# Patient Record
Sex: FEMALE | Race: WHITE | NOT HISPANIC OR LATINO | URBAN - NONMETROPOLITAN AREA
[De-identification: names, ages, dates, MRNs, and addresses within clinical notes are randomized per-mention and may not be internally consistent; named-entity substitution may affect disease eponyms.]

---

## 2024-01-08 ENCOUNTER — OFFICE VISIT (OUTPATIENT)
Dept: SURGERY | Facility: CLINIC | Age: 54
End: 2024-01-08
Payer: COMMERCIAL

## 2024-01-08 VITALS
SYSTOLIC BLOOD PRESSURE: 101 MMHG | HEIGHT: 67 IN | HEART RATE: 80 BPM | DIASTOLIC BLOOD PRESSURE: 70 MMHG | WEIGHT: 153 LBS | BODY MASS INDEX: 24.01 KG/M2

## 2024-01-08 DIAGNOSIS — E65 ABDOMINAL PANNUS: Primary | ICD-10-CM

## 2024-01-08 PROCEDURE — 99203 OFFICE O/P NEW LOW 30 MIN: CPT | Performed by: SURGERY

## 2024-01-08 RX ORDER — PANTOPRAZOLE SODIUM 40 MG/1
TABLET, DELAYED RELEASE ORAL
COMMUNITY
Start: 2024-01-06

## 2024-01-08 RX ORDER — TIRZEPATIDE 12.5 MG/.5ML
INJECTION, SOLUTION SUBCUTANEOUS
COMMUNITY
Start: 2023-12-21

## 2024-01-08 RX ORDER — CLOPIDOGREL BISULFATE 75 MG/1
TABLET ORAL
COMMUNITY
Start: 2024-01-06

## 2024-01-08 RX ORDER — CARVEDILOL 3.12 MG/1
3.12 TABLET ORAL
COMMUNITY
Start: 2024-01-06

## 2024-01-08 RX ORDER — ATORVASTATIN CALCIUM 80 MG/1
TABLET, FILM COATED ORAL
COMMUNITY
Start: 2024-01-06

## 2024-01-08 NOTE — PROGRESS NOTES
"Subjective   Clair Cochran is a 53 y.o. female is being seen for consultation today at the request of No ref. provider found    History of Present Illness    Allergies   Allergen Reactions    Eggs Or Egg-Derived Products Anaphylaxis    Bactrim [Sulfamethoxazole-Trimethoprim] Swelling and Rash     Current Outpatient Medications   Medication Sig Dispense Refill    atorvastatin (LIPITOR) 80 MG tablet TAKE ONE TABLET BY MOUTH EVERY DAY AT BEDTIME WITH FOOD FOR CHOLESTEROL      carvedilol (COREG) 3.125 MG tablet 1 tablet.      clopidogrel (PLAVIX) 75 MG tablet TAKE ONE TABLET BY MOUTH EVERY DAY TO THIN THE BLOOD      Mounjaro 12.5 MG/0.5ML solution pen-injector pen INJECT 0.5 ML (12.5 MG TOTAL) UNDER THE SKIN 1 (ONE) TIME PER WEEK.      pantoprazole (PROTONIX) 40 MG EC tablet TAKE 1 TABLET BY MOUTH EVERY DAY FOR THE STOMACH       No current facility-administered medications for this visit.     Past Medical History:   Diagnosis Date    Anemia     Coronary artery disease     Diabetes mellitus     Diverticulitis of colon     Hypertension     Myocardial infarction      Past Surgical History:   Procedure Laterality Date    BREAST SURGERY      FRACTURE SURGERY         Pertinent Review of Systems:  Respiratory: no shortness of breath  Cardiovascular: no chest pain  Other pertinent:      Objective   /70   Pulse 80   Ht 170.2 cm (67\")   Wt 69.4 kg (153 lb)   BMI 23.96 kg/m²   Physical Exam    Procedures     Results/Data:  Imaging: { }  Notes: { }  Lab: { }  Other: { }    Assessment & Plan            Discussion/Summary    Time spent: { }    BMI is within normal parameters. No other follow-up for BMI required.       No future appointments.      Please note that portions of this note were completed with a voice recognition program.  "

## 2024-01-08 NOTE — PROGRESS NOTES
Subjective   Clair Cochran is a 53 y.o. female is here today as a self referral.    History of Present Illness  Ms. Cochran was seen in the office today to discuss removal of excess skin.  Over the last year the patient has lost 115 pounds with diet, increased exercise and Mounjaro injections.  She states she is now with her goal weight and has been for 1 month.  She reports infections 2-3 times per month under her skin for which she uses triple antibiotic ointment, Aquaphor diaper rash cream, hydrocortisone cream and various lotions.  She reports back pain which she never had prior which she attributes to her hanging skin.  This is worse with prolonged standing.  She also reports physical difficulty with bending and getting dressed due to the hanging skin as well as pressure on her bladder.  Allergies   Allergen Reactions    Eggs Or Egg-Derived Products Anaphylaxis    Bactrim [Sulfamethoxazole-Trimethoprim] Swelling and Rash     Current Outpatient Medications   Medication Sig Dispense Refill    atorvastatin (LIPITOR) 80 MG tablet TAKE ONE TABLET BY MOUTH EVERY DAY AT BEDTIME WITH FOOD FOR CHOLESTEROL      carvedilol (COREG) 3.125 MG tablet 1 tablet.      clopidogrel (PLAVIX) 75 MG tablet TAKE ONE TABLET BY MOUTH EVERY DAY TO THIN THE BLOOD      Mounjaro 12.5 MG/0.5ML solution pen-injector pen INJECT 0.5 ML (12.5 MG TOTAL) UNDER THE SKIN 1 (ONE) TIME PER WEEK.      pantoprazole (PROTONIX) 40 MG EC tablet TAKE 1 TABLET BY MOUTH EVERY DAY FOR THE STOMACH       No current facility-administered medications for this visit.     Past Medical History:   Diagnosis Date    Anemia     Coronary artery disease     Diabetes mellitus     Diverticulitis of colon     Hypertension     Myocardial infarction      Past Surgical History:   Procedure Laterality Date    BREAST SURGERY      FRACTURE SURGERY         Pertinent Review of Systems:  Respiratory: no shortness of breath  Cardiovascular: no chest pain  Other pertinent: Patient does  "take Plavix for a stent placement several years ago.      Objective   /70   Pulse 80   Ht 170.2 cm (67\")   Wt 69.4 kg (153 lb)   BMI 23.96 kg/m²   Physical Exam  On examination this is a thin female in no acute distress  HEENT examination: Within normal limits.  Conjunctiva pink.  Nose and ears appear normal.  Neck: Supple, full range of motion.  No JVD.  Abdomen: Abdomen is soft, nontender.  Umbilical hernia is not appreciated  Musculoskeletal: Full range of motion all extremities without focal weakness. Normal gait. No digital cyanosis.  Psych: Patient is alert, oriented x3. Mood and affect are appropriate.  Skin: Patient has a large pannus both at the umbilicus, pubis and suprapubic areas.  There is some skin irritation noted around the umbilicus.  Procedures     Results/Data:      Assessment & Plan   Symptomatic 3 tier abdominal pannus    Advised patient that she would need to have stable weight for 6 months prior to consideration for removal of excess skin.  She will also need cardiology clearance and will discuss this with her cardiologist.         Discussion/Summary    Time spent:     BMI is within normal parameters. No other follow-up for BMI required.       No future appointments.      Please note that portions of this note were completed with a voice recognition program.  "

## 2024-06-10 ENCOUNTER — OFFICE VISIT (OUTPATIENT)
Dept: SURGERY | Facility: CLINIC | Age: 54
End: 2024-06-10
Payer: COMMERCIAL

## 2024-06-10 VITALS
HEART RATE: 76 BPM | WEIGHT: 141.6 LBS | DIASTOLIC BLOOD PRESSURE: 56 MMHG | HEIGHT: 67 IN | BODY MASS INDEX: 22.22 KG/M2 | SYSTOLIC BLOOD PRESSURE: 106 MMHG

## 2024-06-10 DIAGNOSIS — E65 ABDOMINAL PANNUS: Primary | ICD-10-CM

## 2024-06-10 PROCEDURE — 99213 OFFICE O/P EST LOW 20 MIN: CPT | Performed by: SURGERY

## 2024-06-10 NOTE — PROGRESS NOTES
Subjective   Clair Cochran is a 53 y.o. female here today for excess skin.    History of Present Illness  Ms. Cochran was seen in the office today to discuss removal of excess skin. Over the last 18 months the patient has lost 125 pounds with diet, increased exercise and Mounjaro injections. She states she is now with her goal weight and has been for greater than 6 months. She reports infections 2-3 times per month under her skin for which she uses triple antibiotic ointment, Aquaphor diaper rash cream, hydrocortisone cream and various lotions. She reports back pain which she never had prior which she attributes to her hanging skin. This is worse with prolonged standing. She also reports physical difficulty with bending and getting dressed due to the hanging skin as well as pressure on her bladder.  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  Patient states she has seen her cardiologist who has cleared her for the surgical procedure.  Allergies   Allergen Reactions    Egg-Derived Products Anaphylaxis    Bactrim [Sulfamethoxazole-Trimethoprim] Swelling and Rash       Current Outpatient Medications   Medication Sig Dispense Refill    atorvastatin (LIPITOR) 80 MG tablet TAKE ONE TABLET BY MOUTH EVERY DAY AT BEDTIME WITH FOOD FOR CHOLESTEROL      carvedilol (COREG) 3.125 MG tablet 1 tablet.      clopidogrel (PLAVIX) 75 MG tablet TAKE ONE TABLET BY MOUTH EVERY DAY TO THIN THE BLOOD      Mounjaro 12.5 MG/0.5ML solution pen-injector pen INJECT 0.5 ML (12.5 MG TOTAL) UNDER THE SKIN 1 (ONE) TIME PER WEEK.      pantoprazole (PROTONIX) 40 MG EC tablet TAKE 1 TABLET BY MOUTH EVERY DAY FOR THE STOMACH       No current facility-administered medications for this visit.     Past Medical History:   Diagnosis Date    Anemia     Coronary artery disease     Diabetes mellitus     Diverticulitis of colon     Hypertension     Myocardial  "infarction      Past Surgical History:   Procedure Laterality Date    BREAST SURGERY      FRACTURE SURGERY         Pertinent Review of Systems  Negative for chest pain or shortness of breath    Objective   /56 (BP Location: Left arm)   Pulse 76   Ht 170.2 cm (67\")   Wt 64.2 kg (141 lb 9.6 oz)   BMI 22.18 kg/m²    Physical Exam  HEENT examination: Within normal limits.  Conjunctiva pink.  Nose and ears appear normal.  Neck: Supple, full range of motion.  No JVD.  Abdomen: Abdomen is soft, nontender.  Umbilical hernia is not appreciated  Musculoskeletal: Full range of motion all extremities without focal weakness. Normal gait. No digital cyanosis.  Psych: Patient is alert, oriented x3. Mood and affect are appropriate.  Skin: Patient has a large pannus both at the umbilicus, pubis and suprapubic areas.  There is some skin irritation noted around the umbilicus.  Photos were taken and uploaded to epic to document the excess skin   Procedures     Results/Data:      Assessment & Plan   Symptomatic abdominal pannus    Will submit to insurance for authorization       Discussion/Summary:    Time spent:     BMI is within normal parameters. No other follow-up for BMI required.         Future Appointments   Date Time Provider Department Center   6/10/2024  9:10 AM Gi Roth MD [facility]           This document has been electronically signed by   Mary Ann 10, 2024 09:08 EDT    Please note that portions of this note were completed with a voice recognition program.  "

## 2024-12-03 ENCOUNTER — PREP FOR SURGERY (OUTPATIENT)
Dept: OTHER | Facility: HOSPITAL | Age: 54
End: 2024-12-03
Payer: COMMERCIAL

## 2024-12-03 DIAGNOSIS — E65 ABDOMINAL PANNUS: Primary | ICD-10-CM

## 2025-01-03 ENCOUNTER — PRE-ADMISSION TESTING (OUTPATIENT)
Dept: PREADMISSION TESTING | Facility: HOSPITAL | Age: 55
End: 2025-01-03
Payer: COMMERCIAL

## 2025-01-03 ENCOUNTER — OFFICE VISIT (OUTPATIENT)
Dept: SURGERY | Facility: CLINIC | Age: 55
End: 2025-01-03
Payer: COMMERCIAL

## 2025-01-03 VITALS
HEART RATE: 72 BPM | DIASTOLIC BLOOD PRESSURE: 60 MMHG | BODY MASS INDEX: 21.63 KG/M2 | WEIGHT: 137.8 LBS | HEIGHT: 67 IN | SYSTOLIC BLOOD PRESSURE: 102 MMHG

## 2025-01-03 DIAGNOSIS — E65 ABDOMINAL PANNUS: Primary | ICD-10-CM

## 2025-01-03 DIAGNOSIS — E65 ABDOMINAL PANNUS: ICD-10-CM

## 2025-01-03 LAB
ANION GAP SERPL CALCULATED.3IONS-SCNC: 11.1 MMOL/L (ref 5–15)
BUN SERPL-MCNC: 11 MG/DL (ref 6–20)
BUN/CREAT SERPL: 30.6 (ref 7–25)
CALCIUM SPEC-SCNC: 9.2 MG/DL (ref 8.6–10.5)
CHLORIDE SERPL-SCNC: 108 MMOL/L (ref 98–107)
CO2 SERPL-SCNC: 27.9 MMOL/L (ref 22–29)
CREAT SERPL-MCNC: 0.36 MG/DL (ref 0.57–1)
DEPRECATED RDW RBC AUTO: 45.2 FL (ref 37–54)
EGFRCR SERPLBLD CKD-EPI 2021: 119.3 ML/MIN/1.73
ERYTHROCYTE [DISTWIDTH] IN BLOOD BY AUTOMATED COUNT: 12.9 % (ref 12.3–15.4)
GLUCOSE SERPL-MCNC: 66 MG/DL (ref 65–99)
HCT VFR BLD AUTO: 35.2 % (ref 34–46.6)
HGB BLD-MCNC: 11 G/DL (ref 12–15.9)
MCH RBC QN AUTO: 30 PG (ref 26.6–33)
MCHC RBC AUTO-ENTMCNC: 31.3 G/DL (ref 31.5–35.7)
MCV RBC AUTO: 95.9 FL (ref 79–97)
PLATELET # BLD AUTO: 180 10*3/MM3 (ref 140–450)
PMV BLD AUTO: 9 FL (ref 6–12)
POTASSIUM SERPL-SCNC: 4.3 MMOL/L (ref 3.5–5.2)
RBC # BLD AUTO: 3.67 10*6/MM3 (ref 3.77–5.28)
SODIUM SERPL-SCNC: 147 MMOL/L (ref 136–145)
WBC NRBC COR # BLD AUTO: 4.45 10*3/MM3 (ref 3.4–10.8)

## 2025-01-03 PROCEDURE — 80048 BASIC METABOLIC PNL TOTAL CA: CPT

## 2025-01-03 PROCEDURE — 85027 COMPLETE CBC AUTOMATED: CPT

## 2025-01-03 PROCEDURE — 99213 OFFICE O/P EST LOW 20 MIN: CPT | Performed by: SURGERY

## 2025-01-03 PROCEDURE — 36415 COLL VENOUS BLD VENIPUNCTURE: CPT

## 2025-01-03 RX ORDER — ASCORBIC ACID 500 MG
500 TABLET ORAL DAILY
COMMUNITY

## 2025-01-03 RX ORDER — EZETIMIBE 10 MG/1
10 TABLET ORAL DAILY
COMMUNITY

## 2025-01-03 RX ORDER — FERROUS SULFATE 325(65) MG
325 TABLET ORAL 2 TIMES DAILY WITH MEALS
COMMUNITY

## 2025-01-03 RX ORDER — CALCIUM CARBONATE/VITAMIN D3 500 MG-10
1 TABLET ORAL DAILY
COMMUNITY

## 2025-01-03 RX ORDER — EAR PLUGS
15 EACH OTIC (EAR) DAILY
COMMUNITY

## 2025-01-03 NOTE — PROGRESS NOTES
Subjective   Clair Cochran is a 54 y.o. female here today for H and P.    History of Present Illness  Ms. Cochran was seen in the office today for her preop visit prior to panniculectomy. Over the last 18 months the patient has lost 125 pounds with diet, increased exercise and Mounjaro injections. She states she is now with her goal weight and has been for greater than 6 months. She reports infections 2-3 times per month under her skin for which she uses triple antibiotic ointment, Aquaphor diaper rash cream, hydrocortisone cream and various lotions. She reports back pain which she never had prior which she attributes to her hanging skin. This is worse with prolonged standing. She also reports physical difficulty with bending and getting dressed due to the hanging skin as well as pressure on her bladder.    She was seen by cardiology and cleared for surgery.  She took her last dose of Plavix yesterday.  Allergies   Allergen Reactions    Egg-Derived Products Anaphylaxis    Bactrim [Sulfamethoxazole-Trimethoprim] Swelling and Rash       Current Outpatient Medications   Medication Sig Dispense Refill    ascorbic acid (VITAMIN C) 500 MG tablet Take 1 tablet by mouth Daily.      atorvastatin (LIPITOR) 80 MG tablet Take 1 tablet by mouth Every Night.      Calcium Carb-Cholecalciferol (Oyster Shell Calcium/D) 500-10 MG-MCG tablet tablet Take 1 tablet by mouth Daily.      carvedilol (COREG) 3.125 MG tablet Take 1 tablet by mouth Daily.      Cyanocobalamin (Vitamin B-12) 1000 MCG/15ML liquid Take 15 mL by mouth Daily.      ezetimibe (ZETIA) 10 MG tablet Take 1 tablet by mouth Daily.      ferrous sulfate 325 (65 FE) MG tablet Take 1 tablet by mouth 2 (Two) Times a Day With Meals.      pantoprazole (PROTONIX) 40 MG EC tablet Take 1 tablet by mouth Daily.      clopidogrel (PLAVIX) 75 MG tablet Take 1 tablet by mouth Daily. (Patient not taking: Reported on 1/3/2025)      Mounjaro 12.5 MG/0.5ML solution pen-injector pen Inject  "12.5 mg under the skin into the appropriate area as directed Every 14 (Fourteen) Days. (Patient not taking: Reported on 1/3/2025)       No current facility-administered medications for this visit.     Past Medical History:   Diagnosis Date    Anemia     Coronary artery disease     Diabetes mellitus     Diverticulitis of colon     GERD (gastroesophageal reflux disease)     Hypertension     Myocardial infarction     Sleep apnea      Past Surgical History:   Procedure Laterality Date    BREAST SURGERY      CARDIAC CATHETERIZATION      COLONOSCOPY      CORONARY ANGIOPLASTY WITH STENT PLACEMENT      ENDOSCOPY      FRACTURE SURGERY      SKIN BIOPSY         Pertinent Review of Systems  Negative for chest pain or shortness of breath    Objective   /60 (BP Location: Right arm)   Ht 170.2 cm (67\")   Wt 62.5 kg (137 lb 12.8 oz)   BMI 21.58 kg/m²    Physical Exam  General:  This is a WD WN female in no acute distress  Lungs:  Respiratory effort normal. Auscultation: Clear, without wheezes, rhonchi, rales  Heart:  Regular rate and rhythm, without murmur, gallop, rub.  No pedal edema  Abdomen: No abdominal wall hernias appreciated.  Skin: Patient has multiple tiers of hanging skin.  Procedures     Results/Data:      Assessment & Plan   Symptomatic abdominal pannus    Proceed with panniculectomy       Discussion/Summary: The risks of the surgical procedure were discussed.  Options of alternative treatments including no treatment (if applicable) were discussed.  Patient voiced understanding of the above issues and wishes to proceed    Time spent:     BMI is within normal parameters. No other follow-up for BMI required.       @[email or web address]    This document has been electronically signed by        January 3, 2025 08:47 EST    Please note that portions of this note were completed with a voice recognition program.  "

## 2025-01-03 NOTE — H&P (VIEW-ONLY)
Subjective  Clair Cochran is a 54 y.o. female here today for H and P.    History of Present Illness  Ms. Cochran was seen in the office today for her preop visit prior to panniculectomy. Over the last 18 months the patient has lost 125 pounds with diet, increased exercise and Mounjaro injections. She states she is now with her goal weight and has been for greater than 6 months. She reports infections 2-3 times per month under her skin for which she uses triple antibiotic ointment, Aquaphor diaper rash cream, hydrocortisone cream and various lotions. She reports back pain which she never had prior which she attributes to her hanging skin. This is worse with prolonged standing. She also reports physical difficulty with bending and getting dressed due to the hanging skin as well as pressure on her bladder.    She was seen by cardiology and cleared for surgery.  She took her last dose of Plavix yesterday.  Allergies   Allergen Reactions    Egg-Derived Products Anaphylaxis    Bactrim [Sulfamethoxazole-Trimethoprim] Swelling and Rash       Current Outpatient Medications   Medication Sig Dispense Refill    ascorbic acid (VITAMIN C) 500 MG tablet Take 1 tablet by mouth Daily.      atorvastatin (LIPITOR) 80 MG tablet Take 1 tablet by mouth Every Night.      Calcium Carb-Cholecalciferol (Oyster Shell Calcium/D) 500-10 MG-MCG tablet tablet Take 1 tablet by mouth Daily.      carvedilol (COREG) 3.125 MG tablet Take 1 tablet by mouth Daily.      Cyanocobalamin (Vitamin B-12) 1000 MCG/15ML liquid Take 15 mL by mouth Daily.      ezetimibe (ZETIA) 10 MG tablet Take 1 tablet by mouth Daily.      ferrous sulfate 325 (65 FE) MG tablet Take 1 tablet by mouth 2 (Two) Times a Day With Meals.      pantoprazole (PROTONIX) 40 MG EC tablet Take 1 tablet by mouth Daily.      clopidogrel (PLAVIX) 75 MG tablet Take 1 tablet by mouth Daily. (Patient not taking: Reported on 1/3/2025)      Mounjaro 12.5 MG/0.5ML solution pen-injector pen Inject 12.5  "mg under the skin into the appropriate area as directed Every 14 (Fourteen) Days. (Patient not taking: Reported on 1/3/2025)       No current facility-administered medications for this visit.     Past Medical History:   Diagnosis Date    Anemia     Coronary artery disease     Diabetes mellitus     Diverticulitis of colon     GERD (gastroesophageal reflux disease)     Hypertension     Myocardial infarction     Sleep apnea      Past Surgical History:   Procedure Laterality Date    BREAST SURGERY      CARDIAC CATHETERIZATION      COLONOSCOPY      CORONARY ANGIOPLASTY WITH STENT PLACEMENT      ENDOSCOPY      FRACTURE SURGERY      SKIN BIOPSY         Pertinent Review of Systems  Negative for chest pain or shortness of breath    Objective  /60 (BP Location: Right arm)   Ht 170.2 cm (67\")   Wt 62.5 kg (137 lb 12.8 oz)   BMI 21.58 kg/m²    Physical Exam  General:  This is a WD WN female in no acute distress  Lungs:  Respiratory effort normal. Auscultation: Clear, without wheezes, rhonchi, rales  Heart:  Regular rate and rhythm, without murmur, gallop, rub.  No pedal edema  Abdomen: No abdominal wall hernias appreciated.  Skin: Patient has multiple tiers of hanging skin.  Procedures     Results/Data:      Assessment & Plan  Symptomatic abdominal pannus    Proceed with panniculectomy       Discussion/Summary: The risks of the surgical procedure were discussed.  Options of alternative treatments including no treatment (if applicable) were discussed.  Patient voiced understanding of the above issues and wishes to proceed    Time spent:     BMI is within normal parameters. No other follow-up for BMI required.       @[facility]    This document has been electronically signed by        January 3, 2025 08:47 EST    Please note that portions of this note were completed with a voice recognition program.  This document has been electronically signed by Gi GAYLE MD on January 3, 2025 09:11 EST  "

## 2025-01-03 NOTE — DISCHARGE INSTRUCTIONS

## 2025-01-03 NOTE — H&P
Subjective  Clair Cochran is a 54 y.o. female here today for H and P.    History of Present Illness  Ms. Cochran was seen in the office today for her preop visit prior to panniculectomy. Over the last 18 months the patient has lost 125 pounds with diet, increased exercise and Mounjaro injections. She states she is now with her goal weight and has been for greater than 6 months. She reports infections 2-3 times per month under her skin for which she uses triple antibiotic ointment, Aquaphor diaper rash cream, hydrocortisone cream and various lotions. She reports back pain which she never had prior which she attributes to her hanging skin. This is worse with prolonged standing. She also reports physical difficulty with bending and getting dressed due to the hanging skin as well as pressure on her bladder.    She was seen by cardiology and cleared for surgery.  She took her last dose of Plavix yesterday.  Allergies   Allergen Reactions    Egg-Derived Products Anaphylaxis    Bactrim [Sulfamethoxazole-Trimethoprim] Swelling and Rash       Current Outpatient Medications   Medication Sig Dispense Refill    ascorbic acid (VITAMIN C) 500 MG tablet Take 1 tablet by mouth Daily.      atorvastatin (LIPITOR) 80 MG tablet Take 1 tablet by mouth Every Night.      Calcium Carb-Cholecalciferol (Oyster Shell Calcium/D) 500-10 MG-MCG tablet tablet Take 1 tablet by mouth Daily.      carvedilol (COREG) 3.125 MG tablet Take 1 tablet by mouth Daily.      Cyanocobalamin (Vitamin B-12) 1000 MCG/15ML liquid Take 15 mL by mouth Daily.      ezetimibe (ZETIA) 10 MG tablet Take 1 tablet by mouth Daily.      ferrous sulfate 325 (65 FE) MG tablet Take 1 tablet by mouth 2 (Two) Times a Day With Meals.      pantoprazole (PROTONIX) 40 MG EC tablet Take 1 tablet by mouth Daily.      clopidogrel (PLAVIX) 75 MG tablet Take 1 tablet by mouth Daily. (Patient not taking: Reported on 1/3/2025)      Mounjaro 12.5 MG/0.5ML solution pen-injector pen Inject 12.5  "mg under the skin into the appropriate area as directed Every 14 (Fourteen) Days. (Patient not taking: Reported on 1/3/2025)       No current facility-administered medications for this visit.     Past Medical History:   Diagnosis Date    Anemia     Coronary artery disease     Diabetes mellitus     Diverticulitis of colon     GERD (gastroesophageal reflux disease)     Hypertension     Myocardial infarction     Sleep apnea      Past Surgical History:   Procedure Laterality Date    BREAST SURGERY      CARDIAC CATHETERIZATION      COLONOSCOPY      CORONARY ANGIOPLASTY WITH STENT PLACEMENT      ENDOSCOPY      FRACTURE SURGERY      SKIN BIOPSY         Pertinent Review of Systems  Negative for chest pain or shortness of breath    Objective  /60 (BP Location: Right arm)   Ht 170.2 cm (67\")   Wt 62.5 kg (137 lb 12.8 oz)   BMI 21.58 kg/m²    Physical Exam  General:  This is a WD WN female in no acute distress  Lungs:  Respiratory effort normal. Auscultation: Clear, without wheezes, rhonchi, rales  Heart:  Regular rate and rhythm, without murmur, gallop, rub.  No pedal edema  Abdomen: No abdominal wall hernias appreciated.  Skin: Patient has multiple tiers of hanging skin.  Procedures     Results/Data:      Assessment & Plan  Symptomatic abdominal pannus    Proceed with panniculectomy       Discussion/Summary: The risks of the surgical procedure were discussed.  Options of alternative treatments including no treatment (if applicable) were discussed.  Patient voiced understanding of the above issues and wishes to proceed    Time spent:     BMI is within normal parameters. No other follow-up for BMI required.       @[facility]    This document has been electronically signed by        January 3, 2025 08:47 EST    Please note that portions of this note were completed with a voice recognition program.  This document has been electronically signed by Gi GAYLE MD on January 3, 2025 09:11 EST  "

## 2025-01-07 ENCOUNTER — TELEPHONE (OUTPATIENT)
Dept: SURGERY | Facility: CLINIC | Age: 55
End: 2025-01-07
Payer: COMMERCIAL

## 2025-01-08 ENCOUNTER — ANESTHESIA EVENT (OUTPATIENT)
Dept: PERIOP | Facility: HOSPITAL | Age: 55
End: 2025-01-08
Payer: COMMERCIAL

## 2025-01-08 ENCOUNTER — ANESTHESIA (OUTPATIENT)
Dept: PERIOP | Facility: HOSPITAL | Age: 55
End: 2025-01-08
Payer: COMMERCIAL

## 2025-01-08 ENCOUNTER — HOSPITAL ENCOUNTER (OUTPATIENT)
Facility: HOSPITAL | Age: 55
Discharge: HOME | End: 2025-01-09
Attending: SURGERY | Admitting: SURGERY
Payer: COMMERCIAL

## 2025-01-08 DIAGNOSIS — E65 ABDOMINAL PANNUS: ICD-10-CM

## 2025-01-08 DIAGNOSIS — E65 PANNUS, ABDOMINAL: Primary | ICD-10-CM

## 2025-01-08 LAB — GLUCOSE BLDC GLUCOMTR-MCNC: 65 MG/DL (ref 70–130)

## 2025-01-08 PROCEDURE — 25010000002 KETOROLAC TROMETHAMINE PER 15 MG: Performed by: NURSE ANESTHETIST, CERTIFIED REGISTERED

## 2025-01-08 PROCEDURE — 82948 REAGENT STRIP/BLOOD GLUCOSE: CPT

## 2025-01-08 PROCEDURE — G0378 HOSPITAL OBSERVATION PER HR: HCPCS

## 2025-01-08 PROCEDURE — 25010000002 LIDOCAINE PF 2% 2 % SOLUTION: Performed by: NURSE ANESTHETIST, CERTIFIED REGISTERED

## 2025-01-08 PROCEDURE — 25010000002 CEFAZOLIN PER 500 MG: Performed by: SURGERY

## 2025-01-08 PROCEDURE — 15830 EXC EXCESSIVE SKIN ABDOMEN: CPT | Performed by: SURGERY

## 2025-01-08 PROCEDURE — 25010000002 FENTANYL CITRATE (PF) 50 MCG/ML SOLUTION: Performed by: NURSE ANESTHETIST, CERTIFIED REGISTERED

## 2025-01-08 PROCEDURE — 25010000002 MIDAZOLAM PER 1 MG: Performed by: NURSE ANESTHETIST, CERTIFIED REGISTERED

## 2025-01-08 PROCEDURE — 93005 ELECTROCARDIOGRAM TRACING: CPT | Performed by: ANESTHESIOLOGY

## 2025-01-08 PROCEDURE — 93010 ELECTROCARDIOGRAM REPORT: CPT | Performed by: INTERNAL MEDICINE

## 2025-01-08 PROCEDURE — 25010000002 ONDANSETRON PER 1 MG: Performed by: NURSE ANESTHETIST, CERTIFIED REGISTERED

## 2025-01-08 PROCEDURE — 25010000002 MORPHINE PER 10 MG: Performed by: SURGERY

## 2025-01-08 PROCEDURE — 25010000002 DEXAMETHASONE PER 1 MG: Performed by: NURSE ANESTHETIST, CERTIFIED REGISTERED

## 2025-01-08 PROCEDURE — 25810000003 LACTATED RINGERS PER 1000 ML: Performed by: NURSE ANESTHETIST, CERTIFIED REGISTERED

## 2025-01-08 PROCEDURE — 25010000002 PROPOFOL 200 MG/20ML EMULSION: Performed by: NURSE ANESTHETIST, CERTIFIED REGISTERED

## 2025-01-08 DEVICE — LIGACLIP EXTRA LIGATING CLIP CARTRIDGES: 6 TITANIUM CLIPS/ CARTRIDGE (LARGE)
Type: IMPLANTABLE DEVICE | Site: ABDOMEN | Status: FUNCTIONAL
Brand: LIGACLIP

## 2025-01-08 DEVICE — LIGACLIP EXTRA LIGATING CLIP CARTRIDGES: 6 TITANIUM CLIPS/ CARTRIDGE (MEDIUM)
Type: IMPLANTABLE DEVICE | Site: ABDOMEN | Status: FUNCTIONAL
Brand: LIGACLIP

## 2025-01-08 RX ORDER — CLOPIDOGREL BISULFATE 75 MG/1
75 TABLET ORAL DAILY
Status: CANCELLED | OUTPATIENT
Start: 2025-01-08

## 2025-01-08 RX ORDER — CLOPIDOGREL BISULFATE 75 MG/1
75 TABLET ORAL DAILY
Status: CANCELLED | OUTPATIENT
Start: 2025-01-09

## 2025-01-08 RX ORDER — SODIUM CHLORIDE 0.9 % (FLUSH) 0.9 %
10 SYRINGE (ML) INJECTION AS NEEDED
Status: DISCONTINUED | OUTPATIENT
Start: 2025-01-08 | End: 2025-01-08 | Stop reason: HOSPADM

## 2025-01-08 RX ORDER — CLOPIDOGREL BISULFATE 75 MG/1
75 TABLET ORAL DAILY
Status: DISCONTINUED | OUTPATIENT
Start: 2025-01-08 | End: 2025-01-09 | Stop reason: HOSPADM

## 2025-01-08 RX ORDER — ASCORBIC ACID 500 MG
500 TABLET ORAL DAILY
Status: CANCELLED | OUTPATIENT
Start: 2025-01-08

## 2025-01-08 RX ORDER — FENTANYL CITRATE 50 UG/ML
INJECTION, SOLUTION INTRAMUSCULAR; INTRAVENOUS AS NEEDED
Status: DISCONTINUED | OUTPATIENT
Start: 2025-01-08 | End: 2025-01-08 | Stop reason: SURG

## 2025-01-08 RX ORDER — FENTANYL CITRATE 50 UG/ML
50 INJECTION, SOLUTION INTRAMUSCULAR; INTRAVENOUS
Status: DISCONTINUED | OUTPATIENT
Start: 2025-01-08 | End: 2025-01-08 | Stop reason: HOSPADM

## 2025-01-08 RX ORDER — SODIUM CHLORIDE 9 MG/ML
40 INJECTION, SOLUTION INTRAVENOUS AS NEEDED
Status: DISCONTINUED | OUTPATIENT
Start: 2025-01-08 | End: 2025-01-08 | Stop reason: HOSPADM

## 2025-01-08 RX ORDER — ASCORBIC ACID 500 MG
500 TABLET ORAL DAILY
Status: DISCONTINUED | OUTPATIENT
Start: 2025-01-08 | End: 2025-01-09 | Stop reason: HOSPADM

## 2025-01-08 RX ORDER — SODIUM CHLORIDE, SODIUM LACTATE, POTASSIUM CHLORIDE, CALCIUM CHLORIDE 600; 310; 30; 20 MG/100ML; MG/100ML; MG/100ML; MG/100ML
125 INJECTION, SOLUTION INTRAVENOUS ONCE
Status: DISCONTINUED | OUTPATIENT
Start: 2025-01-08 | End: 2025-01-08 | Stop reason: HOSPADM

## 2025-01-08 RX ORDER — GINGER ROOT/GINGER ROOT EXT 262.5 MG
1 CAPSULE ORAL DAILY
Status: CANCELLED | OUTPATIENT
Start: 2025-01-08

## 2025-01-08 RX ORDER — ACETAMINOPHEN 500 MG
1000 TABLET ORAL EVERY 6 HOURS
Status: DISCONTINUED | OUTPATIENT
Start: 2025-01-08 | End: 2025-01-09 | Stop reason: HOSPADM

## 2025-01-08 RX ORDER — SODIUM CHLORIDE 0.9 % (FLUSH) 0.9 %
10 SYRINGE (ML) INJECTION EVERY 12 HOURS SCHEDULED
Status: DISCONTINUED | OUTPATIENT
Start: 2025-01-08 | End: 2025-01-08 | Stop reason: HOSPADM

## 2025-01-08 RX ORDER — MEPERIDINE HYDROCHLORIDE 25 MG/ML
12.5 INJECTION INTRAMUSCULAR; INTRAVENOUS; SUBCUTANEOUS
Status: DISCONTINUED | OUTPATIENT
Start: 2025-01-08 | End: 2025-01-08 | Stop reason: HOSPADM

## 2025-01-08 RX ORDER — MIDAZOLAM HYDROCHLORIDE 1 MG/ML
1 INJECTION, SOLUTION INTRAMUSCULAR; INTRAVENOUS
Status: DISCONTINUED | OUTPATIENT
Start: 2025-01-08 | End: 2025-01-08 | Stop reason: HOSPADM

## 2025-01-08 RX ORDER — GINGER ROOT/GINGER ROOT EXT 262.5 MG
1 CAPSULE ORAL
Status: DISCONTINUED | OUTPATIENT
Start: 2025-01-09 | End: 2025-01-09 | Stop reason: HOSPADM

## 2025-01-08 RX ORDER — PROCHLORPERAZINE EDISYLATE 5 MG/ML
10 INJECTION INTRAMUSCULAR; INTRAVENOUS EVERY 6 HOURS PRN
Status: DISCONTINUED | OUTPATIENT
Start: 2025-01-08 | End: 2025-01-09 | Stop reason: HOSPADM

## 2025-01-08 RX ORDER — KETOROLAC TROMETHAMINE 30 MG/ML
30 INJECTION, SOLUTION INTRAMUSCULAR; INTRAVENOUS EVERY 6 HOURS PRN
Status: COMPLETED | OUTPATIENT
Start: 2025-01-08 | End: 2025-01-08

## 2025-01-08 RX ORDER — OXYCODONE HYDROCHLORIDE 10 MG/1
10 TABLET ORAL EVERY 4 HOURS PRN
Status: DISCONTINUED | OUTPATIENT
Start: 2025-01-08 | End: 2025-01-09 | Stop reason: HOSPADM

## 2025-01-08 RX ORDER — KETOROLAC TROMETHAMINE 30 MG/ML
15 INJECTION, SOLUTION INTRAMUSCULAR; INTRAVENOUS EVERY 6 HOURS
Status: DISCONTINUED | OUTPATIENT
Start: 2025-01-08 | End: 2025-01-09 | Stop reason: HOSPADM

## 2025-01-08 RX ORDER — LIDOCAINE HYDROCHLORIDE 20 MG/ML
INJECTION, SOLUTION EPIDURAL; INFILTRATION; INTRACAUDAL; PERINEURAL AS NEEDED
Status: DISCONTINUED | OUTPATIENT
Start: 2025-01-08 | End: 2025-01-08 | Stop reason: SURG

## 2025-01-08 RX ORDER — LANOLIN ALCOHOL/MO/W.PET/CERES
1000 CREAM (GRAM) TOPICAL DAILY
Status: DISCONTINUED | OUTPATIENT
Start: 2025-01-08 | End: 2025-01-09 | Stop reason: HOSPADM

## 2025-01-08 RX ORDER — SODIUM CHLORIDE 0.9 % (FLUSH) 0.9 %
10 SYRINGE (ML) INJECTION AS NEEDED
Status: DISCONTINUED | OUTPATIENT
Start: 2025-01-08 | End: 2025-01-09 | Stop reason: HOSPADM

## 2025-01-08 RX ORDER — IPRATROPIUM BROMIDE AND ALBUTEROL SULFATE 2.5; .5 MG/3ML; MG/3ML
3 SOLUTION RESPIRATORY (INHALATION) ONCE AS NEEDED
Status: DISCONTINUED | OUTPATIENT
Start: 2025-01-08 | End: 2025-01-08 | Stop reason: HOSPADM

## 2025-01-08 RX ORDER — OXYCODONE AND ACETAMINOPHEN 5; 325 MG/1; MG/1
1 TABLET ORAL ONCE AS NEEDED
Status: DISCONTINUED | OUTPATIENT
Start: 2025-01-08 | End: 2025-01-08 | Stop reason: HOSPADM

## 2025-01-08 RX ORDER — SODIUM CHLORIDE 9 MG/ML
40 INJECTION, SOLUTION INTRAVENOUS AS NEEDED
Status: DISCONTINUED | OUTPATIENT
Start: 2025-01-08 | End: 2025-01-09 | Stop reason: HOSPADM

## 2025-01-08 RX ORDER — SODIUM CHLORIDE 0.9 % (FLUSH) 0.9 %
3 SYRINGE (ML) INJECTION EVERY 12 HOURS SCHEDULED
Status: DISCONTINUED | OUTPATIENT
Start: 2025-01-08 | End: 2025-01-09 | Stop reason: HOSPADM

## 2025-01-08 RX ORDER — ONDANSETRON 2 MG/ML
4 INJECTION INTRAMUSCULAR; INTRAVENOUS AS NEEDED
Status: DISCONTINUED | OUTPATIENT
Start: 2025-01-08 | End: 2025-01-08 | Stop reason: HOSPADM

## 2025-01-08 RX ORDER — PANTOPRAZOLE SODIUM 40 MG/1
40 TABLET, DELAYED RELEASE ORAL DAILY
Status: CANCELLED | OUTPATIENT
Start: 2025-01-08

## 2025-01-08 RX ORDER — MAGNESIUM HYDROXIDE 1200 MG/15ML
LIQUID ORAL AS NEEDED
Status: DISCONTINUED | OUTPATIENT
Start: 2025-01-08 | End: 2025-01-08 | Stop reason: HOSPADM

## 2025-01-08 RX ORDER — ATORVASTATIN CALCIUM 40 MG/1
80 TABLET, FILM COATED ORAL NIGHTLY
Status: CANCELLED | OUTPATIENT
Start: 2025-01-08

## 2025-01-08 RX ORDER — FERROUS SULFATE 325(65) MG
325 TABLET ORAL 2 TIMES DAILY WITH MEALS
Status: CANCELLED | OUTPATIENT
Start: 2025-01-08

## 2025-01-08 RX ORDER — EPHEDRINE SULFATE 5 MG/ML
INJECTION INTRAVENOUS AS NEEDED
Status: DISCONTINUED | OUTPATIENT
Start: 2025-01-08 | End: 2025-01-08 | Stop reason: SURG

## 2025-01-08 RX ORDER — FAMOTIDINE 10 MG/ML
INJECTION, SOLUTION INTRAVENOUS AS NEEDED
Status: DISCONTINUED | OUTPATIENT
Start: 2025-01-08 | End: 2025-01-08 | Stop reason: SURG

## 2025-01-08 RX ORDER — CISATRACURIUM BESYLATE 2 MG/ML
INJECTION, SOLUTION INTRAVENOUS AS NEEDED
Status: DISCONTINUED | OUTPATIENT
Start: 2025-01-08 | End: 2025-01-08 | Stop reason: SURG

## 2025-01-08 RX ORDER — ONDANSETRON 2 MG/ML
INJECTION INTRAMUSCULAR; INTRAVENOUS AS NEEDED
Status: DISCONTINUED | OUTPATIENT
Start: 2025-01-08 | End: 2025-01-08 | Stop reason: SURG

## 2025-01-08 RX ORDER — MORPHINE SULFATE 2 MG/ML
2 INJECTION, SOLUTION INTRAMUSCULAR; INTRAVENOUS
Status: DISCONTINUED | OUTPATIENT
Start: 2025-01-08 | End: 2025-01-09 | Stop reason: HOSPADM

## 2025-01-08 RX ORDER — CARVEDILOL 3.12 MG/1
3.12 TABLET ORAL DAILY
Status: DISCONTINUED | OUTPATIENT
Start: 2025-01-09 | End: 2025-01-09 | Stop reason: HOSPADM

## 2025-01-08 RX ORDER — SODIUM CHLORIDE, SODIUM LACTATE, POTASSIUM CHLORIDE, CALCIUM CHLORIDE 600; 310; 30; 20 MG/100ML; MG/100ML; MG/100ML; MG/100ML
INJECTION, SOLUTION INTRAVENOUS CONTINUOUS PRN
Status: DISCONTINUED | OUTPATIENT
Start: 2025-01-08 | End: 2025-01-08 | Stop reason: SURG

## 2025-01-08 RX ORDER — PROPOFOL 10 MG/ML
INJECTION, EMULSION INTRAVENOUS AS NEEDED
Status: DISCONTINUED | OUTPATIENT
Start: 2025-01-08 | End: 2025-01-08 | Stop reason: SURG

## 2025-01-08 RX ORDER — CARVEDILOL 3.12 MG/1
3.12 TABLET ORAL DAILY
Status: CANCELLED | OUTPATIENT
Start: 2025-01-09

## 2025-01-08 RX ORDER — ONDANSETRON 2 MG/ML
4 INJECTION INTRAMUSCULAR; INTRAVENOUS EVERY 4 HOURS PRN
Status: DISCONTINUED | OUTPATIENT
Start: 2025-01-08 | End: 2025-01-09 | Stop reason: HOSPADM

## 2025-01-08 RX ORDER — DEXAMETHASONE SODIUM PHOSPHATE 4 MG/ML
INJECTION, SOLUTION INTRA-ARTICULAR; INTRALESIONAL; INTRAMUSCULAR; INTRAVENOUS; SOFT TISSUE AS NEEDED
Status: DISCONTINUED | OUTPATIENT
Start: 2025-01-08 | End: 2025-01-08 | Stop reason: SURG

## 2025-01-08 RX ORDER — PANTOPRAZOLE SODIUM 40 MG/1
40 TABLET, DELAYED RELEASE ORAL
Status: DISCONTINUED | OUTPATIENT
Start: 2025-01-09 | End: 2025-01-09 | Stop reason: HOSPADM

## 2025-01-08 RX ORDER — POLYETHYLENE GLYCOL 3350 17 G/17G
17 POWDER, FOR SOLUTION ORAL 2 TIMES DAILY
Status: DISCONTINUED | OUTPATIENT
Start: 2025-01-08 | End: 2025-01-09 | Stop reason: HOSPADM

## 2025-01-08 RX ORDER — ATORVASTATIN CALCIUM 40 MG/1
80 TABLET, FILM COATED ORAL NIGHTLY
Status: DISCONTINUED | OUTPATIENT
Start: 2025-01-08 | End: 2025-01-09 | Stop reason: HOSPADM

## 2025-01-08 RX ORDER — FERROUS SULFATE 325(65) MG
325 TABLET ORAL 2 TIMES DAILY WITH MEALS
Status: DISCONTINUED | OUTPATIENT
Start: 2025-01-08 | End: 2025-01-09 | Stop reason: HOSPADM

## 2025-01-08 RX ORDER — MIDAZOLAM HYDROCHLORIDE 1 MG/ML
INJECTION, SOLUTION INTRAMUSCULAR; INTRAVENOUS AS NEEDED
Status: DISCONTINUED | OUTPATIENT
Start: 2025-01-08 | End: 2025-01-08 | Stop reason: SURG

## 2025-01-08 RX ORDER — DOCUSATE SODIUM 100 MG/1
100 CAPSULE, LIQUID FILLED ORAL 2 TIMES DAILY
Status: DISCONTINUED | OUTPATIENT
Start: 2025-01-08 | End: 2025-01-09 | Stop reason: HOSPADM

## 2025-01-08 RX ADMIN — LIDOCAINE HYDROCHLORIDE 100 MG: 20 INJECTION, SOLUTION EPIDURAL; INFILTRATION; INTRACAUDAL; PERINEURAL at 12:48

## 2025-01-08 RX ADMIN — DEXAMETHASONE SODIUM PHOSPHATE 4 MG: 4 INJECTION, SOLUTION INTRA-ARTICULAR; INTRALESIONAL; INTRAMUSCULAR; INTRAVENOUS; SOFT TISSUE at 12:55

## 2025-01-08 RX ADMIN — FENTANYL CITRATE 50 MCG: 50 INJECTION, SOLUTION INTRAMUSCULAR; INTRAVENOUS at 17:14

## 2025-01-08 RX ADMIN — FENTANYL CITRATE 50 MCG: 50 INJECTION INTRAMUSCULAR; INTRAVENOUS at 15:58

## 2025-01-08 RX ADMIN — EPHEDRINE SULFATE 5 MG: 5 INJECTION INTRAVENOUS at 14:51

## 2025-01-08 RX ADMIN — EPHEDRINE SULFATE 5 MG: 5 INJECTION INTRAVENOUS at 14:15

## 2025-01-08 RX ADMIN — POLYETHYLENE GLYCOL (3350) 17 G: 17 POWDER, FOR SOLUTION ORAL at 21:13

## 2025-01-08 RX ADMIN — MORPHINE SULFATE 2 MG: 2 INJECTION, SOLUTION INTRAMUSCULAR; INTRAVENOUS at 18:12

## 2025-01-08 RX ADMIN — PROPOFOL 50 MG: 10 INJECTION, EMULSION INTRAVENOUS at 16:47

## 2025-01-08 RX ADMIN — DOCUSATE SODIUM 100 MG: 100 CAPSULE, LIQUID FILLED ORAL at 21:13

## 2025-01-08 RX ADMIN — PROPOFOL 120 MG: 10 INJECTION, EMULSION INTRAVENOUS at 12:48

## 2025-01-08 RX ADMIN — CISATRACURIUM BESYLATE 14 MG: 20 INJECTION INTRAVENOUS at 12:48

## 2025-01-08 RX ADMIN — EPHEDRINE SULFATE 5 MG: 5 INJECTION INTRAVENOUS at 15:00

## 2025-01-08 RX ADMIN — FENTANYL CITRATE 100 MCG: 50 INJECTION INTRAMUSCULAR; INTRAVENOUS at 12:56

## 2025-01-08 RX ADMIN — ATORVASTATIN CALCIUM 80 MG: 40 TABLET, FILM COATED ORAL at 21:13

## 2025-01-08 RX ADMIN — MIDAZOLAM HYDROCHLORIDE 2 MG: 1 INJECTION, SOLUTION INTRAMUSCULAR; INTRAVENOUS at 12:45

## 2025-01-08 RX ADMIN — OXYCODONE HYDROCHLORIDE 10 MG: 10 TABLET ORAL at 21:27

## 2025-01-08 RX ADMIN — SODIUM CHLORIDE, POTASSIUM CHLORIDE, SODIUM LACTATE AND CALCIUM CHLORIDE: 600; 310; 30; 20 INJECTION, SOLUTION INTRAVENOUS at 16:24

## 2025-01-08 RX ADMIN — CISATRACURIUM BESYLATE 4 MG: 20 INJECTION INTRAVENOUS at 14:34

## 2025-01-08 RX ADMIN — FAMOTIDINE 20 MG: 10 INJECTION, SOLUTION INTRAVENOUS at 12:45

## 2025-01-08 RX ADMIN — KETOROLAC TROMETHAMINE 30 MG: 30 INJECTION, SOLUTION INTRAMUSCULAR; INTRAVENOUS at 17:14

## 2025-01-08 RX ADMIN — FENTANYL CITRATE 50 MCG: 50 INJECTION INTRAMUSCULAR; INTRAVENOUS at 15:13

## 2025-01-08 RX ADMIN — SODIUM CHLORIDE, POTASSIUM CHLORIDE, SODIUM LACTATE AND CALCIUM CHLORIDE: 600; 310; 30; 20 INJECTION, SOLUTION INTRAVENOUS at 14:52

## 2025-01-08 RX ADMIN — ONDANSETRON 4 MG: 2 INJECTION INTRAMUSCULAR; INTRAVENOUS at 12:45

## 2025-01-08 RX ADMIN — FENTANYL CITRATE 50 MCG: 50 INJECTION, SOLUTION INTRAMUSCULAR; INTRAVENOUS at 17:03

## 2025-01-08 RX ADMIN — SODIUM CHLORIDE, POTASSIUM CHLORIDE, SODIUM LACTATE AND CALCIUM CHLORIDE: 600; 310; 30; 20 INJECTION, SOLUTION INTRAVENOUS at 12:45

## 2025-01-08 RX ADMIN — CEFAZOLIN 2000 MG: 2 INJECTION, POWDER, FOR SOLUTION INTRAMUSCULAR; INTRAVENOUS at 18:12

## 2025-01-08 NOTE — ANESTHESIA POSTPROCEDURE EVALUATION
Patient: Clair Cochran    Procedure Summary       Date: 01/08/25 Room / Location:  [facility] /  [facility]    Anesthesia Start: 1245 Anesthesia Stop: 1658    Procedure: PANNICULECTOMY (Abdomen) Diagnosis:       Abdominal pannus      (Abdominal pannus [E65])    Surgeons: Gi Roth MD Provider: Keith Yadav DO    Anesthesia Type: general ASA Status: 3            Anesthesia Type: general    Vitals  Vitals Value Taken Time   /71 01/08/25 1739   Temp 97.7 °F (36.5 °C) 01/08/25 1700   Pulse 80 01/08/25 1739   Resp 15 01/08/25 1730   SpO2 97 % 01/08/25 1739   Vitals shown include unfiled device data.        Post Anesthesia Care and Evaluation    Patient location during evaluation: PACU  Patient participation: complete - patient participated  Level of consciousness: awake and alert  Pain score: 0  Pain management: adequate    Airway patency: patent  Anesthetic complications: No anesthetic complications  PONV Status: controlled  Cardiovascular status: acceptable  Respiratory status: acceptable and room air  Hydration status: euvolemic  No anesthesia care post op

## 2025-01-08 NOTE — ANESTHESIA PREPROCEDURE EVALUATION
Anesthesia Evaluation     Patient summary reviewed and Nursing notes reviewed   no history of anesthetic complications:   NPO Solid Status: > 8 hours  NPO Liquid Status: > 8 hours           Airway   Mallampati: II  TM distance: >3 FB  Neck ROM: full  No difficulty expected  Dental    (+) upper dentures and partials    Pulmonary - normal exam    breath sounds clear to auscultation  (+) a smoker Former,sleep apnea  Cardiovascular - normal exam  Exercise tolerance: good (4-7 METS)    ECG reviewed  Patient on routine beta blocker and Beta blocker given within 24 hours of surgery  Rhythm: regular  Rate: normal    (+) hypertension, past MI , CAD      Neuro/Psych- negative ROS  GI/Hepatic/Renal/Endo    (+) GERD, diabetes mellitus    Musculoskeletal (-) negative ROS    Abdominal  - normal exam   Substance History - negative use     OB/GYN negative ob/gyn ROS         Other   blood dyscrasia anemia,                   Anesthesia Plan    ASA 3     general     intravenous induction     Anesthetic plan, risks, benefits, and alternatives have been provided, discussed and informed consent has been obtained with: patient.  Pre-procedure education provided  Plan discussed with CRNA.    CODE STATUS:

## 2025-01-08 NOTE — OP NOTE
Panniculectomy    Surgeon:  Gi Roth M.D., FDionicioAANNA    Assistant:  Symone Bell    Preoperative Diagnosis:  Symptomatic abdominal pannus    Postoperative Diagnosis:  Symptomatic abdominal pannus    Anesthesia:  general    Indications: symptomatic abdominal pannus       Procedure Details   After obtaining informed consent and receiving preoperative antibiotics and with venous compression boots in place, and after markings were placed in the pre-op area,  the patient was taken to the operating room and placed under anesthesia.  Mireles catheter was placed. The abdomen was prepped and draped in a sterile fashion.  An elliptical incision oriented in the vertical plane was made around the umbilicus and carried down to the fascia.  A large transverse incision was then made from lateral to the anterior superior iliac spine to the other side.  The Harmonic scalpel was then used to dissect along the fascia from the pubis to the xiphoid.   The bed was angled 15 degrees and the appropriate level of skin transection was determined.  The excess skin and subcutaneous were transected.  An incision was then made for the umbilicus in the midline.  Multiple 1 Vicryl sutures were used to close the dead space by tacking the fascia to Heber's fascia.  The incision was then closed over 2 MALCOLM drains with 1 Vicryl to Heber's fascia, 0 Vicryl in the subcutaneous, and 3-0 Monocryl for the skin.  The umbilicus was closed with 3-0 Vicryl subdermal sutures and a 4-0 Monocryl.  JPs were sewn in with 2-0 silk.  Dressing and abdominal binder were placed.  Patient tolerated the procedure well and was taken to the recovery room in stable condition.  3.6 lbs removed  Findings:      Estimated Blood Loss:  100 mL    Blood administered:  none           Drains: MALCOLM x 2           Specimens: None     Grafts and Implants: No       Complications:  none           Disposition: PACU - hemodynamically stable.           Condition: stable

## 2025-01-09 VITALS
SYSTOLIC BLOOD PRESSURE: 111 MMHG | TEMPERATURE: 98.1 F | HEIGHT: 67 IN | DIASTOLIC BLOOD PRESSURE: 61 MMHG | BODY MASS INDEX: 20.88 KG/M2 | OXYGEN SATURATION: 100 % | RESPIRATION RATE: 18 BRPM | HEART RATE: 74 BPM | WEIGHT: 133 LBS

## 2025-01-09 PROCEDURE — G0378 HOSPITAL OBSERVATION PER HR: HCPCS

## 2025-01-09 PROCEDURE — 25010000002 CEFAZOLIN PER 500 MG: Performed by: SURGERY

## 2025-01-09 PROCEDURE — 25010000002 KETOROLAC TROMETHAMINE PER 15 MG: Performed by: SURGERY

## 2025-01-09 RX ORDER — OXYCODONE AND ACETAMINOPHEN 5; 325 MG/1; MG/1
1 TABLET ORAL EVERY 6 HOURS PRN
Qty: 28 TABLET | Refills: 0 | Status: SHIPPED | OUTPATIENT
Start: 2025-01-09

## 2025-01-09 RX ADMIN — ACETAMINOPHEN 1000 MG: 500 TABLET, FILM COATED ORAL at 05:35

## 2025-01-09 RX ADMIN — OXYCODONE HYDROCHLORIDE AND ACETAMINOPHEN 500 MG: 500 TABLET ORAL at 08:10

## 2025-01-09 RX ADMIN — CARVEDILOL 3.12 MG: 3.12 TABLET, FILM COATED ORAL at 08:10

## 2025-01-09 RX ADMIN — ACETAMINOPHEN 1000 MG: 500 TABLET, FILM COATED ORAL at 13:05

## 2025-01-09 RX ADMIN — Medication 1 TABLET: at 08:10

## 2025-01-09 RX ADMIN — ACETAMINOPHEN 1000 MG: 500 TABLET, FILM COATED ORAL at 00:49

## 2025-01-09 RX ADMIN — KETOROLAC TROMETHAMINE 15 MG: 30 INJECTION, SOLUTION INTRAMUSCULAR; INTRAVENOUS at 05:37

## 2025-01-09 RX ADMIN — DOCUSATE SODIUM 100 MG: 100 CAPSULE, LIQUID FILLED ORAL at 08:10

## 2025-01-09 RX ADMIN — CEFAZOLIN 2000 MG: 2 INJECTION, POWDER, FOR SOLUTION INTRAMUSCULAR; INTRAVENOUS at 00:50

## 2025-01-09 RX ADMIN — KETOROLAC TROMETHAMINE 15 MG: 30 INJECTION, SOLUTION INTRAMUSCULAR; INTRAVENOUS at 00:49

## 2025-01-09 RX ADMIN — OXYCODONE HYDROCHLORIDE 10 MG: 10 TABLET ORAL at 13:05

## 2025-01-09 RX ADMIN — Medication 1000 MCG: at 08:10

## 2025-01-09 RX ADMIN — POLYETHYLENE GLYCOL (3350) 17 G: 17 POWDER, FOR SOLUTION ORAL at 08:09

## 2025-01-09 RX ADMIN — Medication 3 ML: at 08:11

## 2025-01-09 RX ADMIN — PANTOPRAZOLE SODIUM 40 MG: 40 TABLET, DELAYED RELEASE ORAL at 08:10

## 2025-01-09 RX ADMIN — FERROUS SULFATE TAB 325 MG (65 MG ELEMENTAL FE) 325 MG: 325 (65 FE) TAB at 08:10

## 2025-01-09 NOTE — PLAN OF CARE
Problem: Adult Inpatient Plan of Care  Goal: Plan of Care Review  Outcome: Adequate for Care Transition  Goal: Patient-Specific Goal (Individualized)  Outcome: Adequate for Care Transition  Goal: Absence of Hospital-Acquired Illness or Injury  Outcome: Adequate for Care Transition  Intervention: Identify and Manage Fall Risk  Recent Flowsheet Documentation  Taken 1/9/2025 0800 by Audrey Grossman RN  Safety Promotion/Fall Prevention: safety round/check completed  Intervention: Prevent Skin Injury  Recent Flowsheet Documentation  Taken 1/9/2025 0800 by Audrey Grossman RN  Body Position: position changed independently  Goal: Optimal Comfort and Wellbeing  Outcome: Adequate for Care Transition  Intervention: Monitor Pain and Promote Comfort  Recent Flowsheet Documentation  Taken 1/9/2025 0800 by Audrey Grossman RN  Pain Management Interventions:   around-the-clock dosing utilized   pain management plan reviewed with patient/caregiver  Intervention: Provide Person-Centered Care  Recent Flowsheet Documentation  Taken 1/9/2025 0800 by Audrey Grossman RN  Trust Relationship/Rapport:   care explained   choices provided   questions encouraged   questions answered   thoughts/feelings acknowledged  Goal: Readiness for Transition of Care  Outcome: Adequate for Care Transition     Problem: Surgery Nonspecified  Goal: Absence of Bleeding  Outcome: Adequate for Care Transition  Goal: Effective Bowel Elimination  Outcome: Adequate for Care Transition  Goal: Fluid and Electrolyte Balance  Outcome: Adequate for Care Transition  Goal: Blood Glucose Level Within Target Range  Outcome: Adequate for Care Transition  Goal: Absence of Infection Signs and Symptoms  Outcome: Adequate for Care Transition  Goal: Anesthesia/Sedation Recovery  Outcome: Adequate for Care Transition  Intervention: Optimize Anesthesia Recovery  Recent Flowsheet Documentation  Taken 1/9/2025 0800 by Audrey Grossman RN  Safety Promotion/Fall Prevention: safety  round/check completed  Administration (IS): self-administered  Goal: Optimal Pain Control and Function  Outcome: Adequate for Care Transition  Intervention: Prevent or Manage Pain  Recent Flowsheet Documentation  Taken 1/9/2025 0800 by Audrey Grossman RN  Pain Management Interventions:   around-the-clock dosing utilized   pain management plan reviewed with patient/caregiver  Diversional Activities:   television   smartphone  Goal: Nausea and Vomiting Relief  Outcome: Adequate for Care Transition  Goal: Effective Urinary Elimination  Outcome: Adequate for Care Transition  Goal: Effective Oxygenation and Ventilation  Outcome: Adequate for Care Transition  Intervention: Optimize Oxygenation and Ventilation  Recent Flowsheet Documentation  Taken 1/9/2025 0800 by Audrey Grossman, RN  Activity Management: up ad irina  Head of Bed (HOB) Positioning: HOB elevated   Goal Outcome Evaluation:

## 2025-01-09 NOTE — PLAN OF CARE
Problem: Adult Inpatient Plan of Care  Goal: Plan of Care Review  Outcome: Progressing  Goal: Patient-Specific Goal (Individualized)  Outcome: Progressing  Goal: Absence of Hospital-Acquired Illness or Injury  Outcome: Progressing  Intervention: Identify and Manage Fall Risk  Recent Flowsheet Documentation  Taken 1/8/2025 1812 by Rita Valente RN  Safety Promotion/Fall Prevention: safety round/check completed  Goal: Optimal Comfort and Wellbeing  Outcome: Progressing  Intervention: Monitor Pain and Promote Comfort  Recent Flowsheet Documentation  Taken 1/8/2025 1812 by Rita Valente RN  Pain Management Interventions: pain medication given  Intervention: Provide Person-Centered Care  Recent Flowsheet Documentation  Taken 1/8/2025 1812 by Rita Valente RN  Trust Relationship/Rapport:   care explained   choices provided   emotional support provided   empathic listening provided   questions answered   questions encouraged   reassurance provided   thoughts/feelings acknowledged  Goal: Readiness for Transition of Care  Outcome: Progressing   Goal Outcome Evaluation:

## 2025-01-11 LAB
QT INTERVAL: 420 MS
QTC INTERVAL: 422 MS

## 2025-01-13 ENCOUNTER — OFFICE VISIT (OUTPATIENT)
Dept: SURGERY | Facility: CLINIC | Age: 55
End: 2025-01-13
Payer: COMMERCIAL

## 2025-01-13 VITALS
WEIGHT: 135 LBS | DIASTOLIC BLOOD PRESSURE: 46 MMHG | SYSTOLIC BLOOD PRESSURE: 110 MMHG | BODY MASS INDEX: 21.14 KG/M2 | HEART RATE: 80 BPM

## 2025-01-13 DIAGNOSIS — Z09 POSTOP CHECK: ICD-10-CM

## 2025-01-13 DIAGNOSIS — E65 ABDOMINAL PANNUS: Primary | ICD-10-CM

## 2025-01-13 PROCEDURE — 99024 POSTOP FOLLOW-UP VISIT: CPT | Performed by: SURGERY

## 2025-01-13 NOTE — PROGRESS NOTES
Subjective   Clair Cochran is a 54 y.o. female here today for post op.    History of Present Illness  Ms. Cochran was seen in the office today for her first postoperative visit following panniculectomy 5 days ago.  Drain outputs have come down significantly.  Bowels are working.  She denies any complaints.  Allergies   Allergen Reactions    Egg-Derived Products Anaphylaxis    Bactrim [Sulfamethoxazole-Trimethoprim] Swelling and Rash         Current Outpatient Medications   Medication Sig Dispense Refill    ascorbic acid (VITAMIN C) 500 MG tablet Take 1 tablet by mouth Daily.      atorvastatin (LIPITOR) 80 MG tablet Take 1 tablet by mouth Every Night.      Calcium Carb-Cholecalciferol (Oyster Shell Calcium/D) 500-10 MG-MCG tablet tablet Take 1 tablet by mouth Daily.      carvedilol (COREG) 3.125 MG tablet Take 1 tablet by mouth Daily.      Cyanocobalamin (Vitamin B-12) 1000 MCG/15ML liquid Take 15 mL by mouth Daily.      ezetimibe (ZETIA) 10 MG tablet Take 1 tablet by mouth Daily.      ferrous sulfate 325 (65 FE) MG tablet Take 1 tablet by mouth 2 (Two) Times a Day With Meals.      pantoprazole (PROTONIX) 40 MG EC tablet Take 1 tablet by mouth Daily.      clopidogrel (PLAVIX) 75 MG tablet Take 1 tablet by mouth Daily. (Patient not taking: Reported on 1/13/2025)      Mounjaro 12.5 MG/0.5ML solution pen-injector pen Inject 0.5 mL under the skin into the appropriate area as directed Every 14 (Fourteen) Days. (Patient not taking: Reported on 1/13/2025)      oxyCODONE-acetaminophen (Percocet) 5-325 MG per tablet Take 1 tablet by mouth Every 6 (Six) Hours As Needed for Moderate Pain. (Patient not taking: Reported on 1/13/2025) 28 tablet 0     No current facility-administered medications for this visit.       Objective   Wt 61.2 kg (135 lb)   BMI 21.14 kg/m²    Physical Exam  Abdominal wall: All skin is viable.  After review of MALCOLM output the MALCOLM drains were removed  Results/Data      Procedures     Assessment & Plan   Stable  course, status post panniculectomy    Follow-up in 1 week       Discussion/Summary    BMI is within normal parameters. No other follow-up for BMI required.       No future appointments.      This document has been electronically signed by Connie Gonzalez MA   January 13, 2025 09:05 EST      Please note that portions of this note were completed with a voice recognition program.

## 2025-01-14 NOTE — DISCHARGE SUMMARY
[facility] GENERAL SURGERY DISCHARGE SUMMARY < 48 HOURS    Patient Identification:  Name:  Clair Cochran  Age:  54 y.o.  Sex:  female  :  1970  MRN:  [id number]    Date of Admission: 2025  Date of Discharge:  2025     ADMISSION DIAGNOSIS: Abdominal pannus    DISCHARGE DIAGNOSIS:  Same    PROCEDURES PERFORMED:  Procedure(s):  PANNICULECTOMY       HOSPITAL COURSE  Patient is a 54 y.o. female admitted to [facility] for postop care after surgery.  Please see the admitting history and physical for further details.  Patient did well postoperatively.  She was voiding and tolerating a diet.  She was ambulating without difficulty.  On examination all skin was viable and drains were working.    CONDITION AT DISCHARGE:  Improved    DISCHARGE DISPOSITION   Home    DISCHARGE MEDICATIONS:     Discharge Medications        New Medications        Instructions Start Date   oxyCODONE-acetaminophen 5-325 MG per tablet  Commonly known as: Percocet   Take 1 tablet by mouth Every 6 (Six) Hours As Needed for Moderate Pain.             Continue These Medications        Instructions Start Date   ascorbic acid 500 MG tablet  Commonly known as: VITAMIN C   500 mg, Daily      atorvastatin 80 MG tablet  Commonly known as: LIPITOR   Take 1 tablet by mouth Every Night.      carvedilol 3.125 MG tablet  Commonly known as: COREG   3.125 mg, Daily      clopidogrel 75 MG tablet  Commonly known as: PLAVIX   Take 1 tablet by mouth Daily.      ezetimibe 10 MG tablet  Commonly known as: ZETIA   10 mg, Daily      ferrous sulfate 325 (65 FE) MG tablet   325 mg, 2 Times Daily With Meals      Mounjaro 12.5 MG/0.5ML solution pen-injector  Generic drug: Tirzepatide   Inject 0.5 mL under the skin into the appropriate area as directed Every 14 (Fourteen) Days.      Oyster Shell Calcium/D 500-10 MG-MCG tablet tablet   1 tablet, Daily      pantoprazole 40 MG EC tablet  Commonly known as: PROTONIX   Take 1 tablet by mouth  Daily.      Vitamin B-12 1000 MCG/15ML liquid   15 mL, Daily               FOLLOW-UP:  Dr. Roth in 1 week    Activity and diet instructions given to the patient

## 2025-01-20 ENCOUNTER — OFFICE VISIT (OUTPATIENT)
Dept: SURGERY | Facility: CLINIC | Age: 55
End: 2025-01-20
Payer: COMMERCIAL

## 2025-01-20 VITALS
DIASTOLIC BLOOD PRESSURE: 70 MMHG | HEIGHT: 67 IN | BODY MASS INDEX: 21.19 KG/M2 | HEART RATE: 74 BPM | WEIGHT: 135 LBS | SYSTOLIC BLOOD PRESSURE: 116 MMHG

## 2025-01-20 DIAGNOSIS — Z09 POSTOP CHECK: ICD-10-CM

## 2025-01-20 DIAGNOSIS — E65 ABDOMINAL PANNUS: Primary | ICD-10-CM

## 2025-01-20 PROCEDURE — 99024 POSTOP FOLLOW-UP VISIT: CPT | Performed by: SURGERY

## 2025-01-20 NOTE — PROGRESS NOTES
"Subjective   Clair Cochran is a 54 y.o. female here today for  follow up post op.    History of Present Illness  Ms. Cochran was seen in the office today for her second postop visit following panniculectomy.  Drains were removed last week.  She voices no complaints except some occasional pulling on the right side.  Allergies   Allergen Reactions    Egg-Derived Products Anaphylaxis    Bactrim [Sulfamethoxazole-Trimethoprim] Swelling and Rash         Current Outpatient Medications   Medication Sig Dispense Refill    ascorbic acid (VITAMIN C) 500 MG tablet Take 1 tablet by mouth Daily.      atorvastatin (LIPITOR) 80 MG tablet Take 1 tablet by mouth Every Night.      Calcium Carb-Cholecalciferol (Oyster Shell Calcium/D) 500-10 MG-MCG tablet tablet Take 1 tablet by mouth Daily.      carvedilol (COREG) 3.125 MG tablet Take 1 tablet by mouth Daily.      clopidogrel (PLAVIX) 75 MG tablet Take 1 tablet by mouth Daily.      Cyanocobalamin (Vitamin B-12) 1000 MCG/15ML liquid Take 15 mL by mouth Daily.      ezetimibe (ZETIA) 10 MG tablet Take 1 tablet by mouth Daily.      ferrous sulfate 325 (65 FE) MG tablet Take 1 tablet by mouth 2 (Two) Times a Day With Meals.      Mounjaro 12.5 MG/0.5ML solution pen-injector pen Inject 0.5 mL under the skin into the appropriate area as directed Every 14 (Fourteen) Days.      oxyCODONE-acetaminophen (Percocet) 5-325 MG per tablet Take 1 tablet by mouth Every 6 (Six) Hours As Needed for Moderate Pain. 28 tablet 0    pantoprazole (PROTONIX) 40 MG EC tablet Take 1 tablet by mouth Daily.       No current facility-administered medications for this visit.       Objective   Ht 170.2 cm (67\")   Wt 61.2 kg (135 lb)   BMI 21.14 kg/m²    Physical Exam  Abdominal wall: Drain sites have healed well and all skin is viable.  Results/Data      Procedures     Assessment & Plan   Stable course, status post panniculectomy    Follow-up as needed  Level of activity discussed       Discussion/Summary    BMI is " within normal parameters. No other follow-up for BMI required.       Future Appointments   Date Time Provider Department Center   1/20/2025 11:05 AM Gi Roth MD [facility]         This document has been electronically signed by Connie Gonzalez MA   January 20, 2025 11:00 EST      Please note that portions of this note were completed with a voice recognition program.

## (undated) DEVICE — ANTIBACTERIAL UNDYED BRAIDED (POLYGLACTIN 910), SYNTHETIC ABSORBABLE SUTURE: Brand: COATED VICRYL

## (undated) DEVICE — TOWEL,OR,DSP,ST,BLUE,STD,4/PK,20PK/CS: Brand: MEDLINE

## (undated) DEVICE — PACK,UNIVERSAL,NO GOWNS: Brand: MEDLINE

## (undated) DEVICE — PATIENT RETURN ELECTRODE, SINGLE-USE, CONTACT QUALITY MONITORING, ADULT, WITH 9FT CORD, FOR PATIENTS WEIGING OVER 33LBS. (15KG): Brand: MEGADYNE

## (undated) DEVICE — HOLDER: Brand: DEROYAL

## (undated) DEVICE — SPNG LAP PREWSH SFTPK 18X18IN STRL PK/5

## (undated) DEVICE — PK BASIC 70

## (undated) DEVICE — SUT VIC 1 CTX 36IN J977H

## (undated) DEVICE — LIGACLIP EXTRA LIGATING CLIP CARTRIDGES: 6 TITANIUM CLIPS/ CARTRIDGE (SMALL)
Type: IMPLANTABLE DEVICE | Site: ABDOMEN | Status: NON-FUNCTIONAL
Brand: LIGACLIP

## (undated) DEVICE — DRSNG WND BORDR/ADHS NONADHR/GZ LF 4X14IN STRL

## (undated) DEVICE — PROXIMATE RH ROTATING HEAD SKIN STAPLERS (35 WIDE) CONTAINS 35 STAINLESS STEEL STAPLES: Brand: PROXIMATE

## (undated) DEVICE — BNDR ABD PREMIERPRO HK/LP 9IN 30TO45IN LF

## (undated) DEVICE — ADHS LIQ MASTISOL 2/3ML

## (undated) DEVICE — STRIP,CLOSURE,WOUND,MEDI-STRIP,1/2X4: Brand: MEDLINE

## (undated) DEVICE — SUT SILK 2/0 FS BLK 18IN 685G

## (undated) DEVICE — RESERVOIR,SUCTION,100CC,SILICONE: Brand: MEDLINE

## (undated) DEVICE — BNDR ABD PREM 3PNL 9IN 46TO62IN

## (undated) DEVICE — SKIN PREP TRAY 4 COMPARTM TRAY: Brand: MEDLINE INDUSTRIES, INC.

## (undated) DEVICE — DRN WND HUBLSS FLUT FULL PERF SIL10MM

## (undated) DEVICE — GLV SURG PREMIERPRO MIC LTX PF SZ7 BRN

## (undated) DEVICE — 1LYRTR 16FR10ML100%SIL UMS SNP: Brand: MEDLINE INDUSTRIES, INC.

## (undated) DEVICE — INTENDED FOR TISSUE SEPARATION, AND OTHER PROCEDURES THAT REQUIRE A SHARP SURGICAL BLADE TO PUNCTURE OR CUT.: Brand: BARD-PARKER ® STAINLESS STEEL BLADES

## (undated) DEVICE — SUT MNCRYL 4/0 PS2 18 IN

## (undated) DEVICE — AMD ANTIMICROBIAL NON-ADHERENT ISLAND DRESSING,0.2% POLYHEXAMETHYLENE BIGUANIDE HCI (PHMB): Brand: TELFA

## (undated) DEVICE — ELECTRD BLD EZ CLN MOD 4IN

## (undated) DEVICE — ENSEAL 20 CM SHAFT, LARGE JAW: Brand: ENSEAL X1

## (undated) DEVICE — PIN SFTY SM 1.0625IN PK/2

## (undated) DEVICE — MARKER,SKIN,WI/RULER AND LABELS: Brand: MEDLINE